# Patient Record
Sex: FEMALE | Race: WHITE | Employment: FULL TIME | URBAN - METROPOLITAN AREA
[De-identification: names, ages, dates, MRNs, and addresses within clinical notes are randomized per-mention and may not be internally consistent; named-entity substitution may affect disease eponyms.]

---

## 2017-06-26 RX ORDER — BUPROPION HYDROCHLORIDE 150 MG/1
TABLET ORAL
Qty: 30 TAB | Refills: 5 | Status: SHIPPED | OUTPATIENT
Start: 2017-06-26 | End: 2018-01-05 | Stop reason: SDUPTHER

## 2017-10-10 ENCOUNTER — OFFICE VISIT (OUTPATIENT)
Dept: FAMILY MEDICINE CLINIC | Age: 57
End: 2017-10-10

## 2017-10-10 VITALS
TEMPERATURE: 98.1 F | WEIGHT: 157 LBS | DIASTOLIC BLOOD PRESSURE: 78 MMHG | BODY MASS INDEX: 25.23 KG/M2 | HEIGHT: 66 IN | SYSTOLIC BLOOD PRESSURE: 150 MMHG | HEART RATE: 71 BPM | RESPIRATION RATE: 12 BRPM | OXYGEN SATURATION: 97 %

## 2017-10-10 DIAGNOSIS — H00.015 HORDEOLUM EXTERNUM OF LEFT LOWER EYELID: Primary | ICD-10-CM

## 2017-10-10 RX ORDER — CIPROFLOXACIN HYDROCHLORIDE 3.5 MG/ML
1 SOLUTION/ DROPS TOPICAL
Qty: 5 ML | Refills: 0 | Status: SHIPPED | OUTPATIENT
Start: 2017-10-10 | End: 2017-10-12

## 2017-10-10 NOTE — MR AVS SNAPSHOT
Visit Information Date & Time Provider Department Dept. Phone Encounter #  
 10/10/2017  2:15 PM Magalie Johansen NP [address] [facility] [phone] [id number] Upcoming Health Maintenance Date Due Pneumococcal 19-64 Medium Risk (1 of 1 - PPSV23) 3/26/1980 INFLUENZA AGE 9 TO ADULT 8/1/2017 FOBT Q 1 YEAR, 18+ 10/13/2017 PAP AKA CERVICAL CYTOLOGY 12/15/2018 BREAST CANCER SCRN MAMMOGRAM 12/19/2018 DTaP/Tdap/Td series (2 - Td) 4/19/2026 Allergies as of 10/10/2017  Review Complete On: 10/10/2017 By: Beto Lenz LPN Severity Noted Reaction Type Reactions Erythromycin  03/23/2010    Other (comments) G. I distress Other Plant, Animal, Environmental  12/15/2015    Other (comments) Pt states she is allergic to \"scents\". Ex:candles, scented soap & lotions, perfume, etc.  
 Pcn [Penicillins]  03/23/2010    Unknown (comments) Patient states she can take keflex with no problem, has taken before without any reactions. Tetracycline  03/23/2010    Unknown (comments) Current Immunizations  Reviewed on 4/19/2016 Name Date  
 TD Vaccine 3/23/1996 Tdap 4/19/2016 Not reviewed this visit You Were Diagnosed With   
  
 Codes Comments Hordeolum externum of left lower eyelid    -  Primary ICD-10-CM: H00.015 
ICD-9-CM: 373.11 Vitals BP Pulse Temp Resp Height(growth percentile) Weight(growth percentile) 150/78 71 98.1 °F (36.7 °C) 12 5' 6\" (1.676 m) 157 lb (71.2 kg) SpO2 BMI OB Status Smoking Status 97% 25.34 kg/m2 Postmenopausal Current Every Day Smoker Vitals History BMI and BSA Data Body Mass Index Body Surface Area  
 25.34 kg/m 2 1.82 m 2 Preferred Pharmacy Pharmacy Name Phone [phone] [phone] Your Updated Medication List  
  
   
 This list is accurate as of: 10/10/17  3:04 PM.  Always use your most recent med list.  
  
  
  
  
 buPROPion  mg tablet Commonly known as:  WELLBUTRIN XL  
TAKE 1 TABLET BY MOUTH EVERY MORNING  
  
 ciprofloxacin HCl 0.3 % ophthalmic solution Commonly known as:  Sandhya Lyman Administer 1 Drop to left eye every two (2) hours for 2 days. Then every 4 hours for 4 days  
  
 eflornithine 13.9 % topical cream  
Commonly known as:  Nonstop Games Apply  to affected area two (2) times a day. apply thin layer to affected areas space application by 8 hour  
  
 fluticasone 50 mcg/actuation nasal spray Commonly known as:  Clay [employer] 2 Sprays by Both Nostrils route daily. loratadine 10 mg tablet Commonly known as:  Reza Cutler Take 10 mg by mouth. Prescriptions Sent to Pharmacy Refills  
 ciprofloxacin HCl (CILOXAN) 0.3 % ophthalmic solution 0 Sig: Administer 1 Drop to left eye every two (2) hours for 2 days. Then every 4 hours for 4 days Class: Normal  
 Pharmacy: [facility]/pharmacy #[id number] #: [phone] Route: Left Eye Introducing [facility] SERVICES! Dear Lionel Sherman: Thank you for requesting a [employer] account. Our records indicate that you already have an active [employer] account. You can access your account anytime at [email or web address]. [employer]/FanFueled Did you know that you can access your hospital and ER discharge instructions at any time in [employer]? You can also review all of your test results from your hospital stay or ER visit. Additional Information If you have questions, please visit the Frequently Asked Questions section of the [employer] website at [email or web address] [employer]/[email or web address]/. Remember, [employer]ie is NOT to be used for urgent needs. For medical emergencies, dial 911. Now available from your iPhone and Android! Please provide this summary of care documentation to your next provider. Your primary care clinician is listed as Love Falcon. If you have any questions after today's visit, please call [phone].

## 2017-10-10 NOTE — PROGRESS NOTES
Chief Complaint   Patient presents with    Eye Problem     has a mass under (L) lower eyelid since Sunday.

## 2017-10-10 NOTE — PROGRESS NOTES
HISTORY OF PRESENT ILLNESS  Juan A Greenberg is a 65 y.o. female. HPI  Thought she had eye lash in her eye on Sunday  Looked in the lid and saw stye like lump  Never had stye before  Does not itch or burn, can not feel it  Denies injury    ROS  A comprehensive review of system was obtained and negative except findings in the HPI    Visit Vitals    /78    Pulse 71    Temp 98.1 °F (36.7 °C)    Resp 12    Ht 5' 6\" (1.676 m)    Wt 157 lb (71.2 kg)    SpO2 97%    BMI 25.34 kg/m2     Physical Exam   Eyes:   Left lower inside lid, medial portion with small cyst/stye like structure, injected and irritated skin around the area as well, no exudates seen   Nursing note and vitals reviewed. ASSESSMENT and PLAN  Encounter Diagnoses   Name Primary?  Hordeolum externum of left lower eyelid Yes     Orders Placed This Encounter    ciprofloxacin HCl (CILOXAN) 0.3 % ophthalmic solution     Given cipro ophth soln, reviewed what to expect and how to use, follow up with eye MD if not resolved in 2-3 days    I have discussed the diagnosis with the patient and the intended plan as seen in the above orders. The patient has received an after-visit summary and questions were answered concerning future plans. Patient conveyed understanding of the plan at the time of the visit.     Louise Arnold, MSN, ANP  10/10/2017

## 2018-01-05 ENCOUNTER — TELEPHONE (OUTPATIENT)
Dept: FAMILY MEDICINE CLINIC | Age: 58
End: 2018-01-05

## 2018-01-05 RX ORDER — BUPROPION HYDROCHLORIDE 150 MG/1
TABLET ORAL
Qty: 30 TAB | Refills: 5 | Status: SHIPPED | OUTPATIENT
Start: 2018-01-05 | End: 2018-06-27 | Stop reason: SDUPTHER

## 2018-01-05 NOTE — TELEPHONE ENCOUNTER
----- Message from Winnie Esquivel sent at 1/5/2018 12:29 PM EST -----  Regarding: Dr. Modesto Cohen / Telephone - Refill  Pt needs to get an authorization for the generic brand for \"Wellbutrin\" 150 mg to [facility] ([phone]) Pt unsure of the fax. Pt also has MyChart issues.   Pt's best contact: [phone]

## 2018-01-10 ENCOUNTER — OFFICE VISIT (OUTPATIENT)
Dept: OBGYN CLINIC | Age: 58
End: 2018-01-10

## 2018-01-10 VITALS
HEIGHT: 66 IN | SYSTOLIC BLOOD PRESSURE: 122 MMHG | BODY MASS INDEX: 26.2 KG/M2 | DIASTOLIC BLOOD PRESSURE: 66 MMHG | WEIGHT: 163 LBS

## 2018-01-10 DIAGNOSIS — Z01.419 ENCOUNTER FOR GYNECOLOGICAL EXAMINATION WITHOUT ABNORMAL FINDING: Primary | ICD-10-CM

## 2018-01-10 NOTE — PATIENT INSTRUCTIONS
Well Visit, Women 48 to 72: Care Instructions  Your Care Instructions    Physical exams can help you stay healthy. Your doctor has checked your overall health and may have suggested ways to take good care of yourself. He or she also may have recommended tests. At home, you can help prevent illness with healthy eating, regular exercise, and other steps. Follow-up care is a key part of your treatment and safety. Be sure to make and go to all appointments, and call your doctor if you are having problems. It's also a good idea to know your test results and keep a list of the medicines you take. How can you care for yourself at home? · Reach and stay at a healthy weight. This will lower your risk for many problems, such as obesity, diabetes, heart disease, and high blood pressure. · Get at least 30 minutes of exercise on most days of the week. Walking is a good choice. You also may want to do other activities, such as running, swimming, cycling, or playing tennis or team sports. · Do not smoke. Smoking can make health problems worse. If you need help quitting, talk to your doctor about stop-smoking programs and medicines. These can increase your chances of quitting for good. · Protect your skin from too much sun. When you're outdoors from 10 a.m. to 4 p.m., stay in the shade or cover up with clothing and a hat with a wide brim. Wear sunglasses that block UV rays. Even when it's cloudy, put broad-spectrum sunscreen (SPF 30 or higher) on any exposed skin. · See a dentist one or two times a year for checkups and to have your teeth cleaned. · Wear a seat belt in the car. · Limit alcohol to 1 drink a day. Too much alcohol can cause health problems. Follow your doctor's advice about when to have certain tests. These tests can spot problems early. · Cholesterol.  Your doctor will tell you how often to have this done based on your age, family history, or other things that can increase your risk for heart attack and stroke. · Blood pressure. Have your blood pressure checked during a routine doctor visit. Your doctor will tell you how often to check your blood pressure based on your age, your blood pressure results, and other factors. · Mammogram. Ask your doctor how often you should have a mammogram, which is an X-ray of your breasts. A mammogram can spot breast cancer before it can be felt and when it is easiest to treat. · Pap test and pelvic exam. Ask your doctor how often you should have a Pap test. You may not need to have a Pap test as often as you used to. · Vision. Have your eyes checked every year or two or as often as your doctor suggests. Some experts recommend that you have yearly exams for glaucoma and other age-related eye problems starting at age 48. · Hearing. Tell your doctor if you notice any change in your hearing. You can have tests to find out how well you hear. · Diabetes. Ask your doctor whether you should have tests for diabetes. · Colon cancer. You should begin tests for colon cancer at age 48. You may have one of several tests. Your doctor will tell you how often to have tests based on your age and risk. Risks include whether you already had a precancerous polyp removed from your colon or whether your parents, sisters and brothers, or children have had colon cancer. · Thyroid disease. Talk to your doctor about whether to have your thyroid checked as part of a regular physical exam. Women have an increased chance of a thyroid problem. · Osteoporosis. You should begin tests for bone density at age 72. If you are younger than 72, ask your doctor whether you have factors that may increase your risk for this disease. You may want to have this test before age 72. · Heart attack and stroke risk. At least every 4 to 6 years, you should have your risk for heart attack and stroke assessed.  Your doctor uses factors such as your age, blood pressure, cholesterol, and whether you smoke or have diabetes to show what your risk for a heart attack or stroke is over the next 10 years. When should you call for help? Watch closely for changes in your health, and be sure to contact your doctor if you have any problems or symptoms that concern you. Where can you learn more? Go to http://javi-lenora.info/. Enter S238 in the search box to learn more about \"Well Visit, Women 50 to 72: Care Instructions. \"  Current as of: May 12, 2017  Content Version: 11.4  © 2953-5817 Healthwise, Incorporated. Care instructions adapted under license by [employer] (which disclaims liability or warranty for this information). If you have questions about a medical condition or this instruction, always ask your healthcare professional. [address] any warranty or liability for your use of this information.

## 2018-01-10 NOTE — PROGRESS NOTES
Annual exam ages 44-62    Argenis Marsh is a ,  65 y.o. female [facility] [place] No LMP recorded. Patient is postmenopausal..    She presents for her annual checkup. She is having no significant problems. With regard to the Gardasil vaccine, she is older than the age for which it is FDA approved. Menstrual status:    Her periods are nonexistent in flow due to menopause. She denies dysmenorrhea. She reports no premenstrual symptoms. Contraception:    The current method of family planning is post menopausal status. Sexual history:    She  reports that she does not currently engage in sexual activity but has had male partners.; She reports using the following method of birth control/protection: None. Medical conditions:    Since her last annual GYN exam about one year ago, she has not the following changes in her health history: none. Pap and Mammogram History:    Her most recent Pap smear was normal, obtained 2 year(s) ago. The patient had her mammogram today in our office. Breast Cancer History/Substance Abuse: positive breast cancer in cousin    Osteoporosis History:    Family history does not include a first or second degree relative with osteopenia or osteoporosis. A bone density scan has never been obtained.     Past Medical History:   Diagnosis Date    Allergies     Classical migraine     Constipation, chronic     Depression     Diabetes ([address]     Diet controlled    Dysmenorrhea     Hx of colonoscopy     negative    Incontinence (urinary)     Menopause      Past Surgical History:   Procedure Laterality Date    HX BARTHOLIN CYST MARSUPIALIZATION      HX COLONOSCOPY      HX TUBAL LIGATION  s    HX UROLOGICAL  01/15/16    Urodynamics    HX UROLOGICAL  16    TENSION FREE VAGINAL OBTURATOR WITH CYSTOSCOPY     HX WISDOM TEETH EXTRACTION      LIGATE FALLOPIAN TUBE      BTL    SIGMOIDOSCOPY,DIAGNOSTIC      /Negative and 2006/Negative Current Outpatient Prescriptions   Medication Sig Dispense Refill    buPROPion XL (WELLBUTRIN XL) 150 mg tablet TAKE 1 TABLET BY MOUTH EVERY MORNING 30 Tab 5    fluticasone (FLONASE) 50 mcg/actuation nasal spray 2 Sprays by Both Nostrils route daily. 1 Bottle 3    loratadine (CLARITIN) 10 mg tablet Take 10 mg by mouth.  eflornithine (VANIQA) 13.9 % topical cream Apply  to affected area two (2) times a day. apply thin layer to affected areas space application by 8 hour 30 g 2     Allergies: Erythromycin; Other plant, animal, environmental; Pcn [penicillins]; and Tetracycline     Tobacco History:  reports that she has been smoking. She has a 40.00 pack-year smoking history. She has never used smokeless tobacco.  Alcohol Abuse:  reports that she does not drink alcohol. Drug Abuse:  reports that she does not use illicit drugs.     Family Medical/Cancer History:   Family History   Problem Relation Age of Onset    Hypertension Mother     High Cholesterol Mother     Other Mother      Diverticulitis of colon    Cataract Mother     Diabetes Father     Coronary Artery Disease Father     Heart Disease Father     Other Father      Angina    Other Brother      Angina    High Cholesterol Brother     COPD Brother     Breast Cancer Cousin         Review of Systems - History obtained from the patient  Constitutional: negative for weight loss, fever, night sweats  HEENT: negative for hearing loss, earache, congestion, snoring, sorethroat  CV: negative for chest pain, palpitations, edema  Resp: negative for cough, shortness of breath, wheezing  GI: negative for change in bowel habits, abdominal pain, black or bloody stools  : negative for frequency, dysuria, hematuria, vaginal discharge  MSK: negative for back pain, joint pain, muscle pain  Breast: negative for breast lumps, nipple discharge, galactorrhea  Skin :negative for itching, rash, hives  Neuro: negative for dizziness, headache, confusion, weakness  Psych: negative for anxiety, depression, change in mood  Heme/lymph: negative for bleeding, bruising, pallor    Physical Exam    Visit Vitals    /66    Ht 5' 6\" (1.676 m)    Wt 163 lb (73.9 kg)    BMI 26.31 kg/m2       Constitutional  · Appearance: well-nourished, well developed, alert, in no acute distress    HENT  · Head and Face: appears normal    Neck  · Inspection/Palpation: normal appearance, no masses or tenderness  · Lymph Nodes: no lymphadenopathy present  · Thyroid: gland size normal, nontender, no nodules or masses present on palpation    Chest  · Respiratory Effort: breathing unlabored  · Auscultation: normal breath sounds    Cardiovascular  · Heart:  · Auscultation: regular rate and rhythm without murmur    Breasts  · Inspection of Breasts: breasts symmetrical, no skin changes, no discharge present, nipple appearance normal, no skin retraction present  · Palpation of Breasts and Axillae: no masses present on palpation, no breast tenderness  · Axillary Lymph Nodes: no lymphadenopathy present    Gastrointestinal  · Abdominal Examination: abdomen non-tender to palpation, normal bowel sounds, no masses present  · Liver and spleen: no hepatomegaly present, spleen not palpable  · Hernias: no hernias identified    Genitourinary  External Genitalia: normal appearance for age, no discharge present, no tenderness present, no inflammatory lesions present, no masses present, no atrophy present  Vagina: well healed vaginal vault with intact TVT--no exposed mesh, no tenderness, normal discharge present, no inflammatory lesions present, no masses present  Bladder: non-tender to palpation  Urethra: appears normal  Cervix: normal   Uterus: normal size, shape and consistency  Adnexa: no adnexal tenderness present, no adnexal masses present  Perineum: perineum within normal limits, no evidence of trauma, no rashes or skin lesions present  Anus: anus within normal limits, no hemorrhoids present  Inguinal Lymph Nodes: no lymphadenopathy present    Skin  · General Inspection: no rash, no lesions identified    Neurologic/Psychiatric  · Mental Status:  · Orientation: grossly oriented to person, place and time  · Mood and Affect: mood normal, affect appropriate    Assessment:  Routine gynecologic examination  Her current medical status is satisfactory with no evidence of significant gynecologic issues.   Bladder much better, but not 100% dry--discussed    Plan:  Counseled re: diet, exercise, healthy lifestyle  Return for yearly wellness visits  Rec annual mammogram

## 2018-06-27 RX ORDER — BUPROPION HYDROCHLORIDE 150 MG/1
TABLET ORAL
Qty: 30 TAB | Refills: 5 | Status: SHIPPED | OUTPATIENT
Start: 2018-06-27 | End: 2018-12-16 | Stop reason: SDUPTHER

## 2018-12-18 RX ORDER — BUPROPION HYDROCHLORIDE 150 MG/1
TABLET ORAL
Qty: 30 TAB | Refills: 5 | Status: SHIPPED | OUTPATIENT
Start: 2018-12-18 | End: 2019-06-09 | Stop reason: SDUPTHER

## 2019-01-11 ENCOUNTER — OFFICE VISIT (OUTPATIENT)
Dept: FAMILY MEDICINE CLINIC | Age: 59
End: 2019-01-11

## 2019-01-11 VITALS
DIASTOLIC BLOOD PRESSURE: 82 MMHG | HEART RATE: 75 BPM | BODY MASS INDEX: 28.12 KG/M2 | OXYGEN SATURATION: 100 % | WEIGHT: 175 LBS | SYSTOLIC BLOOD PRESSURE: 141 MMHG | TEMPERATURE: 98.5 F | RESPIRATION RATE: 18 BRPM | HEIGHT: 66 IN

## 2019-01-11 DIAGNOSIS — Z00.00 ENCOUNTER FOR ANNUAL PHYSICAL EXAM: Primary | ICD-10-CM

## 2019-01-11 DIAGNOSIS — Z12.11 SCREENING FOR MALIGNANT NEOPLASM OF COLON: ICD-10-CM

## 2019-01-11 DIAGNOSIS — Z13.29 SCREENING FOR THYROID DISORDER: ICD-10-CM

## 2019-01-11 DIAGNOSIS — E55.9 VITAMIN D DEFICIENCY: ICD-10-CM

## 2019-01-11 DIAGNOSIS — R05.3 CHRONIC COUGH: ICD-10-CM

## 2019-01-11 DIAGNOSIS — Z23 ENCOUNTER FOR IMMUNIZATION: ICD-10-CM

## 2019-01-11 RX ORDER — EPINEPHRINE 0.3 MG/.3ML
0.3 INJECTION SUBCUTANEOUS
Qty: 2 SYRINGE | Refills: 2 | Status: SHIPPED | OUTPATIENT
Start: 2019-01-11 | End: 2019-01-11

## 2019-01-11 NOTE — PROGRESS NOTES
Chief Complaint   Patient presents with    Physical    Labs     Patient in office today for cpe and fasting labs. Pt would like to have a rx for epipen. Pt states she receives gyn care @ [address] [facility].

## 2019-01-11 NOTE — PROGRESS NOTES
Chief Complaint   Patient presents with    Physical    Labs     Patient in office today for cpe and fasting labs. Pt would like to have a rx for epipen. Pt states she receives gyn care @ [address]. Pt moved to [facility]. Had to care for her brother and mother. Brother was diagnosed with lung cancer and passed in October. Pt quit smoking on 10/22 after 44 years. wellbutrin seems to be helping this. Has gained weight from quitting. Eating a lot of sugar. Back and forth 1-2 times a week to help out with her mom. Will start looking for a job in March. Pt has an appt with Dr. Joycelyn Wallis for her pap and mammogram.     Health Maintenance Due   Topic Date Due    Shingrix Vaccine Age 50> (1 of 2) 03/26/2011    FOBT Q 1 YEAR, 18+  10/13/2017    PAP AKA CERVICAL CYTOLOGY  12/15/2018     Last colonoscopy was many years ago and had polyps removed. Dr. Donny Oliveira. Needs refill for her epi pen. Denies any other concerns at this time. Chief Complaint   Patient presents with   Chetan Brito     she is a 63y.o. year old female who presents for evalution. Reviewed PmHx, RxHx, FmHx, SocHx, AllgHx and updated and dated in the chart.     Review of Systems - negative except as listed above in the HPI    Objective:     Vitals:    01/11/19 1039   BP: 141/82   Pulse: 75   Resp: 18   Temp: 98.5 °F (36.9 °C)   TempSrc: Oral   SpO2: 100%   Weight: 175 lb (79.4 kg)   Height: 5' 6\" (1.676 m)     Physical Examination: General appearance - alert, well appearing, and in no distress  Mental status - normal mood, behavior  Eyes - pupils equal and reactive, extraocular eye movements intact  Ears - bilateral TM's and external ear canals normal  Nose - normal and patent, no erythema, discharge or polyps and normal nontender sinuses  Mouth - mucous membranes moist, pharynx normal without lesions  Neck - supple, no significant adenopathy, carotids upstroke normal bilaterally, no bruits, thyroid exam: thyroid is normal in size without nodules or tenderness  Chest - clear to auscultation, no wheezes, rales or rhonchi, symmetric air entry  Heart - normal rate, regular rhythm, normal S1, S2, no murmurs  Abdomen - soft, nontender, nondistended, no masses or organomegaly  bowel sounds normal  Extremities - peripheral pulses normal, no ankle edema, no clubbing or cyanosis  Skin - normal coloration and turgor, no rashes, no suspicious skin lesions noted    Assessment/ Plan:   Diagnoses and all orders for this visit:    1. Encounter for annual physical exam  -     LIPID PANEL  -     METABOLIC PANEL, COMPREHENSIVE  -     CBC WITH AUTOMATED DIFF  Healthy appearing 63year old female. Will notify results and deviate plan based on findings. 2. Screening for thyroid disorder  -     TSH 3RD GENERATION  Screening, asx.   3. Vitamin D deficiency  -     VITAMIN D, 25 HYDROXY  Will notify results and deviate plan based on findings. 4. Chronic cough  -     XR CHEST PA LAT; Future  Will notify results and deviate plan based on findings. 5. Encounter for immunization  -     varicella-zoster recombinant, PF, (SHINGRIX, PF,) 50 mcg/0.5 mL susr injection; 0.5 mL by IntraMUSCular route once for 1 dose. Enc pt to complete shingrix series. 6. Screening for malignant neoplasm of colon  -     REFERRAL TO GASTROENTEROLOGY  Referral to Dr. Rafy Gil for colonoscopy. Other orders  -     EPINEPHrine (EPIPEN) 0.3 mg/0.3 mL injection; 0.3 mL by IntraMUSCular route once as needed for Anaphylaxis or Allergic Response for up to 1 dose. Refilled rx. Follow-up Disposition:  Return if symptoms worsen or fail to improve. I have discussed the diagnosis with the patient and the intended plan as seen in the above orders. The patient has received an after-visit summary and questions were answered concerning future plans.      Medication Side Effects and Warnings were discussed with patient: yes  Patient Labs were reviewed and or requested: yes  Patient Past Records were reviewed and or requested  yes  Patient / Caregiver Understanding of treatment plan was verbalized during office visit YES    JONY Rome    There are no Patient Instructions on file for this visit.

## 2019-01-12 LAB
25(OH)D3+25(OH)D2 SERPL-MCNC: 21.3 NG/ML (ref 30–100)
ALBUMIN SERPL-MCNC: 4.7 G/DL (ref 3.5–5.5)
ALBUMIN/GLOB SERPL: 2.1 {RATIO} (ref 1.2–2.2)
ALP SERPL-CCNC: 95 IU/L (ref 39–117)
ALT SERPL-CCNC: 9 IU/L (ref 0–32)
AST SERPL-CCNC: 11 IU/L (ref 0–40)
BASOPHILS # BLD AUTO: 0 X10E3/UL (ref 0–0.2)
BASOPHILS NFR BLD AUTO: 0 %
BILIRUB SERPL-MCNC: 0.4 MG/DL (ref 0–1.2)
BUN SERPL-MCNC: 10 MG/DL (ref 6–24)
BUN/CREAT SERPL: 11 (ref 9–23)
CALCIUM SERPL-MCNC: 9.3 MG/DL (ref 8.7–10.2)
CHLORIDE SERPL-SCNC: 107 MMOL/L (ref 96–106)
CHOLEST SERPL-MCNC: 195 MG/DL (ref 100–199)
CO2 SERPL-SCNC: 24 MMOL/L (ref 20–29)
CREAT SERPL-MCNC: 0.93 MG/DL (ref 0.57–1)
EOSINOPHIL # BLD AUTO: 0.2 X10E3/UL (ref 0–0.4)
EOSINOPHIL NFR BLD AUTO: 2 %
ERYTHROCYTE [DISTWIDTH] IN BLOOD BY AUTOMATED COUNT: 13.1 % (ref 12.3–15.4)
GLOBULIN SER CALC-MCNC: 2.2 G/DL (ref 1.5–4.5)
GLUCOSE SERPL-MCNC: 100 MG/DL (ref 65–99)
HCT VFR BLD AUTO: 40.6 % (ref 34–46.6)
HDLC SERPL-MCNC: 51 MG/DL
HGB BLD-MCNC: 13.3 G/DL (ref 11.1–15.9)
IMM GRANULOCYTES # BLD AUTO: 0 X10E3/UL (ref 0–0.1)
IMM GRANULOCYTES NFR BLD AUTO: 0 %
INTERPRETATION, 910389: NORMAL
LDLC SERPL CALC-MCNC: 118 MG/DL (ref 0–99)
LYMPHOCYTES # BLD AUTO: 1.9 X10E3/UL (ref 0.7–3.1)
LYMPHOCYTES NFR BLD AUTO: 23 %
MCH RBC QN AUTO: 29.5 PG (ref 26.6–33)
MCHC RBC AUTO-ENTMCNC: 32.8 G/DL (ref 31.5–35.7)
MCV RBC AUTO: 90 FL (ref 79–97)
MONOCYTES # BLD AUTO: 0.5 X10E3/UL (ref 0.1–0.9)
MONOCYTES NFR BLD AUTO: 6 %
NEUTROPHILS # BLD AUTO: 5.5 X10E3/UL (ref 1.4–7)
NEUTROPHILS NFR BLD AUTO: 69 %
PLATELET # BLD AUTO: 229 X10E3/UL (ref 150–379)
POTASSIUM SERPL-SCNC: 4.7 MMOL/L (ref 3.5–5.2)
PROT SERPL-MCNC: 6.9 G/DL (ref 6–8.5)
RBC # BLD AUTO: 4.51 X10E6/UL (ref 3.77–5.28)
SODIUM SERPL-SCNC: 144 MMOL/L (ref 134–144)
TRIGL SERPL-MCNC: 128 MG/DL (ref 0–149)
TSH SERPL DL<=0.005 MIU/L-ACNC: 3.57 UIU/ML (ref 0.45–4.5)
VLDLC SERPL CALC-MCNC: 26 MG/DL (ref 5–40)
WBC # BLD AUTO: 8 X10E3/UL (ref 3.4–10.8)

## 2019-01-14 NOTE — PROGRESS NOTES
The following message was sent to pt via [employer] portal in reference to lab results:    Good morning Ms. Sonali Solorio,     Attached are the results of your most recent lab work. I have the following recommendations:    1. Your CBC which looks at your white blood cells, red blood cells, and hemoglobin came back looking normal. No sign of infection or anemia. 2. Your metabolic panel which looks at your blood glucose, liver function, and kidney function looks good minus your fasting glucose being a little elevated. Therefore I am adding on a hemoglobin a1c which is a test that measures your average blood sugar over a 3 month period of time. We also use it to screen for prediabetes or early type 2 diabetes. I will notify you when I receive and review those results. 3. Your cholesterol is a little high. Your LDL or \"bad cholesterol\" should be closer to 70. I urge you to work on making some diet and lifestyle changes to try and improve this. The BEST way to lower cholesterol is to follow a strict diet that is low fat combined with regular exercise. Here are a few tips on how to do this:  - Avoid foods that are high in saturated fats (especially fried foods)  - Replace butter with margarine  - Eat lots of fresh fruits and vegetables  - Choose fish, chicken, and turkey as your serving of meat  - Try to avoid too many processed foods  - Choose non fat milk  - Use whole wheat bread  You should also try and do 30 minutes of aerobic exercise most days of the week. All of these will contribute to lowering your cholesterol and decrease your risk of heart disease. 4. Your TSH which screens for thyroid disease came back normal. This means you do not have hyper or hypothyroidism. 5. Your vitamin D is a little low. I recommend you start taking an over the counter calcium and vitamin D supplement that contains 2,000 IUs of vitamin D daily to improve this.  Having normal vitamin D levels is important because you need vitamin D to absorb calcium into the bone and having low vitamin D levels increases your risk for osteoporosis down the road. Lets recheck these labs in 6 months so we can recheck your cholesterol.  Please do not hesitate to call me or schedule an appointment to be seen if you need anything else in the meantime :)    Carolann Frye, MASSIELC

## 2019-01-15 LAB
HBA1C MFR BLD: 5.6 % (ref 4.8–5.6)
SPECIMEN STATUS REPORT, ROLRST: NORMAL

## 2019-01-15 NOTE — PROGRESS NOTES
The following message was sent to pt via [employer] portal in reference to lab results:    Good morning Ms. James Welch,     [facility], your hemoglobin a1c came back normal. This is a test that measures your average blood sugar over the last 3 months and is used to screen for prediabetes or early type 2 diabetes.      Luz Zheng, MASSIELC

## 2019-01-30 ENCOUNTER — OFFICE VISIT (OUTPATIENT)
Dept: OBGYN CLINIC | Age: 59
End: 2019-01-30

## 2019-01-30 ENCOUNTER — HOSPITAL ENCOUNTER (OUTPATIENT)
Dept: GENERAL RADIOLOGY | Age: 59
Discharge: HOME | End: 2019-01-30
Payer: COMMERCIAL

## 2019-01-30 VITALS
DIASTOLIC BLOOD PRESSURE: 72 MMHG | WEIGHT: 177 LBS | BODY MASS INDEX: 28.45 KG/M2 | SYSTOLIC BLOOD PRESSURE: 120 MMHG | HEIGHT: 66 IN

## 2019-01-30 DIAGNOSIS — Z01.419 ENCOUNTER FOR GYNECOLOGICAL EXAMINATION (GENERAL) (ROUTINE) WITHOUT ABNORMAL FINDINGS: Primary | ICD-10-CM

## 2019-01-30 DIAGNOSIS — R05.3 CHRONIC COUGH: ICD-10-CM

## 2019-01-30 DIAGNOSIS — R35.0 URINARY FREQUENCY: ICD-10-CM

## 2019-01-30 PROCEDURE — 71046 X-RAY EXAM CHEST 2 VIEWS: CPT

## 2019-01-30 RX ORDER — TOLTERODINE 4 MG/1
4 CAPSULE, EXTENDED RELEASE ORAL DAILY
Qty: 90 CAP | Refills: 3 | Status: SHIPPED | OUTPATIENT
Start: 2019-01-30 | End: 2019-02-28

## 2019-01-30 NOTE — PROGRESS NOTES
Annual exam ages 44-62    Argenis Burks is a ,  63 y.o. female [facility] No LMP recorded. Patient is postmenopausal.    She presents for her annual checkup. She is having urinary frequency. No nocturia. With regard to the Gardasil vaccine, she is older than the age for which it is FDA approved. Menstrual status:    Has gone through menopause     Contraception:    The current method of family planning is abstinence and post menopausal status. Sexual history:    She  reports that she does not currently engage in sexual activity but has had partners who are Male. She reports using the following method of birth control/protection: None. Medical conditions:    Since her last annual GYN exam about one year ago, she has not the following changes in her health history: none. Pap and Mammogram History:    Her most recent Pap smear was normal, obtained 3 year(s) ago. The patient had her mammogram today in our office. Breast Cancer History/Substance Abuse: negative    Osteoporosis History:    Family history does not include a first or second degree relative with osteopenia or osteoporosis.       Past Medical History:   Diagnosis Date    Allergies     Classical migraine     Constipation, chronic     Depression     Diabetes ([address]     Diet controlled    Dysmenorrhea     Hx of colonoscopy     negative    Incontinence (urinary)     Menopause      Past Surgical History:   Procedure Laterality Date    HX BARTHOLIN CYST MARSUPIALIZATION      HX COLONOSCOPY      HX TUBAL LIGATION      HX UROLOGICAL  01/15/16    Urodynamics    HX UROLOGICAL  16    TENSION FREE VAGINAL OBTURATOR WITH CYSTOSCOPY     HX WISDOM TEETH EXTRACTION      LIGATE FALLOPIAN TUBE      BTL    NC SIGMOIDOSCOPY FLX DX W/COLLJ SPEC BR/WA IF PFRMD      /Negative and 2006/Negative       Current Outpatient Medications   Medication Sig Dispense Refill    vit C/vit E/lutein/min/omega-3 (OCUVITE PO) Take  by mouth.  Lactobacillus acidophilus (PROBIOTIC PO) Take  by mouth.  buPROPion XL (WELLBUTRIN XL) 150 mg tablet TAKE 1 TABLET BY MOUTH EVERY MORNING 30 Tab 5    fluticasone (FLONASE) 50 mcg/actuation nasal spray 2 Sprays by Both Nostrils route daily. 1 Bottle 3    loratadine (CLARITIN) 10 mg tablet Take 10 mg by mouth. Allergies: Erythromycin; Other plant, animal, environmental; Pcn [penicillins]; and Tetracycline     Tobacco History:  reports that she quit smoking about 3 months ago. She has a 40.00 pack-year smoking history. she has never used smokeless tobacco.  Alcohol Abuse:  reports that she does not drink alcohol. Drug Abuse:  reports that she does not use drugs.     Family Medical/Cancer History:   Family History   Problem Relation Age of Onset    Hypertension Mother     High Cholesterol Mother     Other Mother         Diverticulitis of colon    Cataract Mother     Diabetes Father     Coronary Artery Disease Father     Heart Disease Father     Other Father         Angina    Other Brother         Angina    High Cholesterol Brother     COPD Brother     Breast Cancer Cousin         Review of Systems - History obtained from the patient  Constitutional: negative for weight loss, fever, night sweats  HEENT: negative for hearing loss, earache, congestion, snoring, sorethroat  CV: negative for chest pain, palpitations, edema  Resp: negative for cough, shortness of breath, wheezing  GI: negative for change in bowel habits, abdominal pain, black or bloody stools  : negative for frequency, dysuria, hematuria, vaginal discharge  MSK: negative for back pain, joint pain, muscle pain  Breast: negative for breast lumps, nipple discharge, galactorrhea  Skin :negative for itching, rash, hives  Neuro: negative for dizziness, headache, confusion, weakness  Psych: negative for anxiety, depression, change in mood  Heme/lymph: negative for bleeding, bruising, pallor    Physical Exam    Visit Vitals  /72   Ht 5' 6\" (1.676 m)   Wt 177 lb (80.3 kg)   BMI 28.57 kg/m²       Constitutional  · Appearance: well-nourished, well developed, alert, in no acute distress    HENT  · Head and Face: appears normal    Neck  · Inspection/Palpation: normal appearance, no masses or tenderness  · Lymph Nodes: no lymphadenopathy present  · Thyroid: gland size normal, nontender, no nodules or masses present on palpation    Chest  · Respiratory Effort: breathing unlabored  · Auscultation: normal breath sounds    Cardiovascular  · Heart:  · Auscultation: regular rate and rhythm without murmur    Breasts  · Inspection of Breasts: breasts symmetrical, no skin changes, no discharge present, nipple appearance normal, no skin retraction present  · Palpation of Breasts and Axillae: no masses present on palpation, no breast tenderness  · Axillary Lymph Nodes: no lymphadenopathy present    Gastrointestinal  · Abdominal Examination: abdomen non-tender to palpation, normal bowel sounds, no masses present  · Liver and spleen: no hepatomegaly present, spleen not palpable  · Hernias: no hernias identified    Genitourinary  · External Genitalia: normal appearance for age, no discharge present, no tenderness present, no inflammatory lesions present, no masses present, no atrophy present  · Vagina: normal vaginal vault without central or paravaginal defects, no discharge present, no inflammatory lesions present, no masses present  · Bladder: non-tender to palpation  · Urethra: appears normal  · Cervix: normal   · Uterus: normal size, shape and consistency  · Adnexa: no adnexal tenderness present, no adnexal masses present  · Perineum: perineum within normal limits, no evidence of trauma, no rashes or skin lesions present  · Anus: anus within normal limits, no hemorrhoids present  · Inguinal Lymph Nodes: no lymphadenopathy present    Skin  · General Inspection: no rash, no lesions identified    Neurologic/Psychiatric  · Mental Status:  · Orientation: grossly oriented to person, place and time  · Mood and Affect: mood normal, affect appropriate    Assessment:  Routine gynecologic examination  Her current medical status is satisfactory with no evidence of significant gynecologic issues. Mild urinary frequency    Plan:  Rx Detrol LA 4 mg daily. Call us in a few weeks with f/u.   Counseled re: diet, exercise, healthy lifestyle  Return for yearly wellness visits  Rec annual mammogram

## 2019-01-30 NOTE — PATIENT INSTRUCTIONS
[facility] Desk: [phone]       Well Visit, Women 48 to 72: Care Instructions  Your Care Instructions    Physical exams can help you stay healthy. Your doctor has checked your overall health and may have suggested ways to take good care of yourself. He or she also may have recommended tests. At home, you can help prevent illness with healthy eating, regular exercise, and other steps. Follow-up care is a key part of your treatment and safety. Be sure to make and go to all appointments, and call your doctor if you are having problems. It's also a good idea to know your test results and keep a list of the medicines you take. How can you care for yourself at home? · Reach and stay at a healthy weight. This will lower your risk for many problems, such as obesity, diabetes, heart disease, and high blood pressure. · Get at least 30 minutes of exercise on most days of the week. Walking is a good choice. You also may want to do other activities, such as running, swimming, cycling, or playing tennis or team sports. · Do not smoke. Smoking can make health problems worse. If you need help quitting, talk to your doctor about stop-smoking programs and medicines. These can increase your chances of quitting for good. · Protect your skin from too much sun. When you're outdoors from 10 a.m. to 4 p.m., stay in the shade or cover up with clothing and a hat with a wide brim. Wear sunglasses that block UV rays. Even when it's cloudy, put broad-spectrum sunscreen (SPF 30 or higher) on any exposed skin. · See a dentist one or two times a year for checkups and to have your teeth cleaned. · Wear a seat belt in the car. · Limit alcohol to 1 drink a day. Too much alcohol can cause health problems. Follow your doctor's advice about when to have certain tests. These tests can spot problems early. · Cholesterol.  Your doctor will tell you how often to have this done based on your age, family history, or other things that can increase your risk for heart attack and stroke. · Blood pressure. Have your blood pressure checked during a routine doctor visit. Your doctor will tell you how often to check your blood pressure based on your age, your blood pressure results, and other factors. · Mammogram. Ask your doctor how often you should have a mammogram, which is an X-ray of your breasts. A mammogram can spot breast cancer before it can be felt and when it is easiest to treat. · Pap test and pelvic exam. Ask your doctor how often you should have a Pap test. You may not need to have a Pap test as often as you used to. · Vision. Have your eyes checked every year or two or as often as your doctor suggests. Some experts recommend that you have yearly exams for glaucoma and other age-related eye problems starting at age 48. · Hearing. Tell your doctor if you notice any change in your hearing. You can have tests to find out how well you hear. · Diabetes. Ask your doctor whether you should have tests for diabetes. · Colon cancer. You should begin tests for colon cancer at age 48. You may have one of several tests. Your doctor will tell you how often to have tests based on your age and risk. Risks include whether you already had a precancerous polyp removed from your colon or whether your parents, sisters and brothers, or children have had colon cancer. · Thyroid disease. Talk to your doctor about whether to have your thyroid checked as part of a regular physical exam. Women have an increased chance of a thyroid problem. · Osteoporosis. You should begin tests for bone density at age 72. If you are younger than 72, ask your doctor whether you have factors that may increase your risk for this disease. You may want to have this test before age 72. · Heart attack and stroke risk. At least every 4 to 6 years, you should have your risk for heart attack and stroke assessed.  Your doctor uses factors such as your age, blood pressure, cholesterol, and whether you smoke or have diabetes to show what your risk for a heart attack or stroke is over the next 10 years. When should you call for help? Watch closely for changes in your health, and be sure to contact your doctor if you have any problems or symptoms that concern you. Where can you learn more? Go to http://javi-lenora.info/. Enter I631 in the search box to learn more about \"Well Visit, Women 50 to 72: Care Instructions. \"  Current as of: March 28, 2018  Content Version: 11.9  © 8879-3824 [employer]. Care instructions adapted under license by [employer] (which disclaims liability or warranty for this information). If you have questions about a medical condition or this instruction, always ask your healthcare professional. [address] any warranty or liability for your use of this information.

## 2019-01-31 NOTE — PROGRESS NOTES
The following message was sent to pt via [employer] portal in reference to lab results:    Good morning Ms. Ibanez,     Attached are the results of your chest xray. It shows no acute findings. There is some mild changes present likely from your long history of smoking but remaining smoke free should help improve the scarring in your lung tissue! Please let me know if you have any questions or concerns regarding these results.    JONY Adhikari

## 2019-02-02 LAB
CYTOLOGIST CVX/VAG CYTO: NORMAL
CYTOLOGY CVX/VAG DOC THIN PREP: NORMAL
CYTOLOGY HISTORY:: NORMAL
DX ICD CODE: NORMAL
HPV I/H RISK 1 DNA CVX QL PROBE+SIG AMP: NEGATIVE
Lab: NORMAL
OTHER STN SPEC: NORMAL
PATH REPORT.FINAL DX SPEC: NORMAL
STAT OF ADQ CVX/VAG CYTO-IMP: NORMAL

## 2019-02-05 ENCOUNTER — TELEPHONE (OUTPATIENT)
Dept: OBGYN CLINIC | Age: 59
End: 2019-02-05

## 2019-02-05 NOTE — TELEPHONE ENCOUNTER
Elfi Baca LPN   Caller: Unspecified (Today, 10:54 AM)             Insurance will cover   Oxybutynin   Oxybutynin ER 1

## 2019-02-05 NOTE — TELEPHONE ENCOUNTER
Patient calling stating that she left a VM last week stating that her Detrol LA is not covered and needs a PA. Patient advised that I will send the message and have her check into the medication. Please advise.

## 2019-02-05 NOTE — TELEPHONE ENCOUNTER
Spoke with patient - she is aware that we are awaiting Dr. Braeden Grady to determine which medication he would like to prescribe and she will be notified.

## 2019-02-11 RX ORDER — OXYBUTYNIN CHLORIDE 5 MG/1
5 TABLET ORAL 3 TIMES DAILY
Qty: 90 TAB | Refills: 3 | Status: SHIPPED | OUTPATIENT
Start: 2019-02-11

## 2019-02-11 NOTE — TELEPHONE ENCOUNTER
Patient aware that rx was sent by Dr. Joycelyn Wallis and to give it the 3 months and let us know how she is doing. Patient verbalized understanding.

## 2019-02-28 ENCOUNTER — TELEPHONE (OUTPATIENT)
Dept: OBGYN CLINIC | Age: 59
End: 2019-02-28

## 2019-02-28 RX ORDER — TOLTERODINE 4 MG/1
4 CAPSULE, EXTENDED RELEASE ORAL DAILY
Qty: 90 CAP | Refills: 3 | Status: SHIPPED | OUTPATIENT
Start: 2019-02-28

## 2019-02-28 NOTE — TELEPHONE ENCOUNTER
Re-rx with Detrol LA. If needs pre-auth, send to [facility]--can use the side effects from this to justify new rx.

## 2019-02-28 NOTE — TELEPHONE ENCOUNTER
West DENIS LPN   Caller: Unspecified (Today, 10:52 AM)             Medication approved.     Previous Messages

## 2019-02-28 NOTE — TELEPHONE ENCOUNTER
Patient of [address] [place] [address] Dr Martinez. He wrote for patient to be given Detrol LA originally (Tolterodine ER). The patient's insurance required for her to try Ditropan (Oxybutynin) first.  PA would be required. Patient is calling to say that she has noticed since she started taking the Oxybutynin that she has had nausea and dizziness all day long like vertigo. She said that she stopped the medication yesterday and has improved. Meclizine did not help. She did seem to have improvement with the bladder spasms. Please advise.       [facility] [facility] [address]

## 2019-02-28 NOTE — TELEPHONE ENCOUNTER
Janneth Salamanca, I resent the Detrol LA rx for the patient. I am sure it will need a PA as needed previously before another rx was tried (Oxybutynin). The patient side effects were dizziness and nausea on Oxybutynin.

## 2019-03-29 ENCOUNTER — TELEPHONE (OUTPATIENT)
Dept: OBGYN CLINIC | Age: 59
End: 2019-03-29

## 2019-03-29 NOTE — TELEPHONE ENCOUNTER
03/29/19  12:17 pm- LM for patient that rx has been sent and to call if not covered.   VM is identifiable

## 2019-03-29 NOTE — TELEPHONE ENCOUNTER
Sometimes we have to try a number of different options. Rx for Myrbetriq sent. Try that if it's covered. If not, we'll try something else.

## 2019-03-29 NOTE — TELEPHONE ENCOUNTER
Patient of [facility]. Calling because she originally tried Ditropan and she got very nauseated and dizzy with the medication. She switched over to Detrol to try that. She experienced dizziness that was pretty severe. She switched it to taking it only at night. She would wake up dizzy and first thing in the morning in which she had a \"hard time getting herself together\". She feels that the medication is not helping much. She feels that she reduced the amount of juice and water she drinks daily has helped slightly. If she goes for a walk, 3 minutes into the exercise she feels the urge to urinate.       [facility]

## 2019-05-20 NOTE — TELEPHONE ENCOUNTER
Call received at 316PM    63year old patient last seen in the office on 1/30/19. Patient calling to say that she has tried two medications for her urine problem and they both have made her dizzy. This nurse advised of a medication that was sen to her pharmacy on 3/29/19. Patient to check at the pharmacy to see about coverage of that medication and call the office back if needed. Patient wanting a copy of her chest x-ray to be sent somewhere. This nurse advised of need to have a permission to release form signed. This nurse to mail the permission to release form to patient confirmed address. Patient verbalized understanding.

## 2019-05-22 ENCOUNTER — DOCUMENTATION ONLY (OUTPATIENT)
Dept: FAMILY MEDICINE CLINIC | Age: 59
End: 2019-05-22

## 2019-05-22 NOTE — PROGRESS NOTES
Faxed 01/30/19 chest xray results to [address] [place] [address] per medical release. Fax #[phone] confirmation & medical release scanned in chart.

## 2019-05-31 ENCOUNTER — TELEPHONE (OUTPATIENT)
Dept: OBGYN CLINIC | Age: 59
End: 2019-05-31

## 2019-05-31 NOTE — TELEPHONE ENCOUNTER
Message left at 134PM    63year old patient last seen in the office on 1/30/19. Patient left a message to say that she found from the pharmacy that the medication sent on 3/29/19 [place] is not covered by her insurance. This nurse called the pharmacy and was advised that the prescription needs a PA. This nurse asked the pharmacy to fax a denial to our office at . Patient advised of the need for a PA and that we will work on that come Monday after we get the denial from the pharmacy. Patient provided with the my chart help desk number of     Patient verbalized understanding.

## 2019-06-04 NOTE — TELEPHONE ENCOUNTER
This nurse called the pharmacy back to ask whether a denial has been sent to the office so a prior authorization. This nurse advised that it had been sent . This nurse asked for the fax to be resent.     The pharmacy advised that they will sent a alfredo fax to

## 2019-06-06 RX ORDER — FESOTERODINE FUMARATE 8 MG/1
8 TABLET, EXTENDED RELEASE ORAL DAILY
Qty: 30 TAB | Refills: 3 | Status: SHIPPED | OUTPATIENT
Start: 2019-06-06

## 2019-06-06 NOTE — TELEPHONE ENCOUNTER
Patient advised of insurance requirements and prescription sent as per MD order to patient preferred pharmacy. Patient verbalized understanding.

## 2019-06-06 NOTE — TELEPHONE ENCOUNTER
Prior authorization has been denied per . Patient needs to try two generics before insurance will cover the myrebetriq. Patient has tried ditropan and needs to try Charleen Guillory or something else.       Please advise

## 2019-06-09 RX ORDER — BUPROPION HYDROCHLORIDE 150 MG/1
TABLET ORAL
Qty: 30 TAB | Refills: 2 | Status: SHIPPED | OUTPATIENT
Start: 2019-06-09 | End: 2019-09-03 | Stop reason: SDUPTHER

## 2019-07-25 ENCOUNTER — PATIENT MESSAGE (OUTPATIENT)
Dept: OBGYN CLINIC | Age: 59
End: 2019-07-25

## 2019-07-26 RX ORDER — SOLIFENACIN SUCCINATE 10 MG/1
10 TABLET, FILM COATED ORAL DAILY
Qty: 30 TAB | Refills: 3 | Status: SHIPPED | OUTPATIENT
Start: 2019-07-26

## 2019-07-26 NOTE — TELEPHONE ENCOUNTER
From: Gurmeet Singer  Sent: 7/25/2019 9:33 AM EDT  Subject: Prescription Question    can you please update my medication list? delete the following medications that i am not taking: Tolteradine  Mirabegron    Also the Festerodine/Toviaz that was just prescribed was working great BUT it kept me constipated and my mouth was way too dry. I have stopped taking it because i couldn't eat, i could barely talk because i was so dry. I even tried the Biotene for dry mouth rinse and the tablets and they didn't help. What is the next step? Hopefully we can find something because these $60 co pays are killing me when they don't work.    thanks  Ocapi

## 2019-09-04 RX ORDER — BUPROPION HYDROCHLORIDE 150 MG/1
TABLET ORAL
Qty: 30 TAB | Refills: 2 | Status: SHIPPED | OUTPATIENT
Start: 2019-09-04

## 2019-10-08 ENCOUNTER — DOCUMENTATION ONLY (OUTPATIENT)
Dept: FAMILY MEDICINE CLINIC | Age: 59
End: 2019-10-08

## 2019-10-08 NOTE — PROGRESS NOTES
[facility]'s medical records request was faxed to [facility] at [phone] to be processed.

## 2019-11-15 ENCOUNTER — DOCUMENTATION ONLY (OUTPATIENT)
Dept: FAMILY MEDICINE CLINIC | Age: 59
End: 2019-11-15

## 2019-11-15 NOTE — PROGRESS NOTES
[facility]'s medical records request was faxed to [employer] at [phone] to be processed.